# Patient Record
Sex: FEMALE | Race: WHITE | Employment: UNEMPLOYED | URBAN - METROPOLITAN AREA
[De-identification: names, ages, dates, MRNs, and addresses within clinical notes are randomized per-mention and may not be internally consistent; named-entity substitution may affect disease eponyms.]

---

## 2021-09-09 ENCOUNTER — APPOINTMENT (OUTPATIENT)
Dept: GENERAL RADIOLOGY | Age: 46
End: 2021-09-09
Payer: COMMERCIAL

## 2021-09-09 ENCOUNTER — HOSPITAL ENCOUNTER (EMERGENCY)
Age: 46
Discharge: HOME | End: 2021-09-09
Attending: EMERGENCY MEDICINE
Payer: COMMERCIAL

## 2021-09-09 VITALS
OXYGEN SATURATION: 98 % | WEIGHT: 150 LBS | HEIGHT: 69 IN | TEMPERATURE: 98.4 F | BODY MASS INDEX: 22.22 KG/M2 | SYSTOLIC BLOOD PRESSURE: 117 MMHG | RESPIRATION RATE: 16 BRPM | DIASTOLIC BLOOD PRESSURE: 68 MMHG | HEART RATE: 67 BPM

## 2021-09-09 DIAGNOSIS — S61.052A CAT BITE OF LEFT THUMB, INITIAL ENCOUNTER: Primary | ICD-10-CM
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
## 2021-09-09 PROCEDURE — 6360000002 HC RX W HCPCS: Performed by: EMERGENCY MEDICINE

## 2021-09-09 PROCEDURE — 90715 TDAP VACCINE 7 YRS/> IM: CPT | Performed by: EMERGENCY MEDICINE

## 2021-09-09 PROCEDURE — 73140 X-RAY EXAM OF FINGER(S): CPT

## 2021-09-09 PROCEDURE — 90471 IMMUNIZATION ADMIN: CPT | Performed by: EMERGENCY MEDICINE

## 2021-09-09 PROCEDURE — 99283 EMERGENCY DEPT VISIT LOW MDM: CPT

## 2021-09-09 PROCEDURE — 6370000000 HC RX 637 (ALT 250 FOR IP): Performed by: EMERGENCY MEDICINE

## 2021-09-09 RX ORDER — AMOXICILLIN AND CLAVULANATE POTASSIUM 875; 125 MG/1; MG/1
1 TABLET, FILM COATED ORAL ONCE
Status: COMPLETED | OUTPATIENT
Start: 2021-09-09 | End: 2021-09-09

## 2021-09-09 RX ORDER — LIDOCAINE HYDROCHLORIDE AND EPINEPHRINE BITARTRATE 20; .01 MG/ML; MG/ML
20 INJECTION, SOLUTION SUBCUTANEOUS ONCE
Status: DISCONTINUED | OUTPATIENT
Start: 2021-09-09 | End: 2021-09-09

## 2021-09-09 RX ORDER — AMOXICILLIN AND CLAVULANATE POTASSIUM 875; 125 MG/1; MG/1
1 TABLET, FILM COATED ORAL 2 TIMES DAILY
Qty: 20 TABLET | Refills: 0 | Status: SHIPPED | OUTPATIENT
Start: 2021-09-09 | End: 2021-09-19

## 2021-09-09 RX ORDER — ACETAMINOPHEN 500 MG
TABLET ORAL
Status: DISPENSED
Start: 2021-09-09 | End: 2021-09-10

## 2021-09-09 RX ORDER — AMOXICILLIN AND CLAVULANATE POTASSIUM 875; 125 MG/1; MG/1
TABLET, FILM COATED ORAL
Status: DISPENSED
Start: 2021-09-09 | End: 2021-09-10

## 2021-09-09 RX ORDER — NAPROXEN 500 MG/1
500 TABLET ORAL 2 TIMES DAILY
Qty: 20 TABLET | Refills: 0 | Status: SHIPPED | OUTPATIENT
Start: 2021-09-09 | End: 2021-09-19

## 2021-09-09 RX ORDER — ACETAMINOPHEN 500 MG
1000 TABLET ORAL ONCE
Status: COMPLETED | OUTPATIENT
Start: 2021-09-09 | End: 2021-09-09

## 2021-09-09 RX ADMIN — ACETAMINOPHEN 1000 MG: 500 TABLET, FILM COATED ORAL at 21:53

## 2021-09-09 RX ADMIN — AMOXICILLIN AND CLAVULANATE POTASSIUM 1 TABLET: 875; 125 TABLET, FILM COATED ORAL at 21:52

## 2021-09-09 RX ADMIN — TETANUS TOXOID, REDUCED DIPHTHERIA TOXOID AND ACELLULAR PERTUSSIS VACCINE, ADSORBED 0.5 ML: 5; 2.5; 8; 8; 2.5 SUSPENSION INTRAMUSCULAR at 21:53

## 2021-09-09 ASSESSMENT — PAIN SCALES - GENERAL
PAINLEVEL_OUTOF10: 5
PAINLEVEL_OUTOF10: 5

## 2021-09-09 ASSESSMENT — PAIN DESCRIPTION - ORIENTATION: ORIENTATION: LEFT

## 2021-09-09 ASSESSMENT — PAIN DESCRIPTION - LOCATION: LOCATION: HAND

## 2021-09-10 NOTE — ED PROVIDER NOTES
Triage Chief Complaint:   Animal Bite (cat bite to left thumb last evening)    [place]:  Iftikhar Arellano is a 55 y.o. female that presents with a cat bite to left thumb. Patient was in baseline state of health until approximately 24 hours ago when a cat bit her thumb. Patient initially washed off the wound but has had some progressive pain and redness to this area. Patient reports that she \"went on [employer]\" and saw some bad pictures of infected cat bites and this prompted ED visit. Patient reports isolated injury to her left thumb. Bite occurred at the distal pad where the nail ends at the tip of the finger. Patient has not had any drainage from this area. There is no redness streaking up the finger. No fevers. Patient is visiting from out of town where she lives in [place]. Patient's tetanus is not up-to-date. ROS:  General:  No fevers, no chills  Respiratory:  No shortness of breath  Neurologic:  No numbness, no weakness  Extremities:  No edema, + thumb pain  Skin:  No rash. + Cat bite wound. Psych: No axienty    History reviewed. No pertinent past medical history. History reviewed. No pertinent surgical history. History reviewed. No pertinent family history.   Social History     Socioeconomic History    Marital status:      Spouse name: Not on file    Number of children: Not on file    Years of education: Not on file    Highest education level: Not on file   Occupational History    Not on file   Tobacco Use    Smoking status: Former Smoker    Smokeless tobacco: Never Used   Substance and Sexual Activity    Alcohol use: Not Currently    Drug use: Never    Sexual activity: Not on file   Other Topics Concern    Not on file   Social History Narrative    Not on file     Social Determinants of Health     Financial Resource Strain:     Difficulty of Paying Living Expenses:    Food Insecurity:     Worried About Running Out of Food in the Last Year:     [address] in the Last Year: Transportation Needs:     Lack of Transportation (Medical):  Lack of Transportation (Non-Medical):    Physical Activity:     Days of Exercise per Week:     Minutes of Exercise per Session:    Stress:     Feeling of Stress :    Social Connections:     Frequency of Communication with Friends and Family:     Frequency of Social Gatherings with Friends and Family:     Attends Jainism Services:     Active Member of Clubs or Organizations:     Attends Club or Organization Meetings:     Marital Status:    Intimate Partner Violence:     Fear of Current or Ex-Partner:     Emotionally Abused:     Physically Abused:     Sexually Abused:      No current facility-administered medications for this encounter. Current Outpatient Medications   Medication Sig Dispense Refill    amoxicillin-clavulanate (AUGMENTIN) 875-125 MG per tablet Take 1 tablet by mouth 2 times daily for 10 days 20 tablet 0    naproxen (NAPROSYN) 500 MG tablet Take 1 tablet by mouth 2 times daily for 10 days 20 tablet 0     No Known Allergies    Nursing Notes Reviewed    Physical Exam:  ED Triage Vitals   Enc Vitals Group      BP 09/09/21 2135 (!) 138/98      Pulse 09/09/21 2135 76      Resp 09/09/21 2135 16      Temp 09/09/21 2137 98.4 °F (36.9 °C)      Temp Source 09/09/21 2137 Oral      SpO2 09/09/21 2135 97 %      Weight 09/09/21 2135 150 lb (68 kg)      Height 09/09/21 2135 5' 9\" (1.753 m)      Head Circumference --       Peak Flow --       Pain Score --       Pain Loc --       Pain Edu? --       Excl. in [address]? --        My pulse ox interpretation is - normal    General appearance:  No acute distress. Sitting comfortably in bed. Skin:  Warm. Dry. Punctate puncture wound that is healing to the distal pad/tip of the left thumb. There is no lymphangitic streaking. No expressible drainage. No underlying fluctuance. Eye:  Extraocular movements intact. Ears, nose, mouth and throat:  Oral mucosa moist   Extremity:  No swelling. Normal ROM. Skin findings of left thumb as above. No fusiform swelling of the left thumb digit. No tenderness along the flexor tendon sheath. Finger is not held in slight flexion. There is no pain with passive range of motion of the thumb. Heart:  Strong and symmetric peripheral pulses. Extremities are well perfused. Abdomen:  Non-distended. Respiratory:  Respirations nonlabored. Neurological:  Alert and oriented times 3. No focal neuro deficits. Sensation intact to light touch to distal upper/lower extremities; 5/5 and symmetric  and dorsi/plantar flexion. I have reviewed and interpreted all of the currently available lab results from this visit (if applicable):  No results found for this visit on 09/09/21. Radiographs (if obtained):  [] The following radiograph was interpreted by myself in the absence of a radiologist:   [x] Radiologist's Report Reviewed:  XR FINGER LEFT (MIN 2 VIEWS)   Final Result   No acute osseous abnormality or radiopaque foreign body. EKG (if obtained): (All EKG's are interpreted by myself in the absence of a cardiologist)    Chart review shows recent radiographs:  XR FINGER LEFT (MIN 2 VIEWS)    Result Date: 9/9/2021  EXAMINATION: THREE XRAY VIEWS OF THE LEFT FINGERS 9/9/2021 9:59 pm COMPARISON: None. HISTORY: ORDERING SYSTEM PROVIDED HISTORY: left thumb cat bite TECHNOLOGIST PROVIDED HISTORY: Reason for exam:->left thumb cat bite Reason for Exam: cat bite on thumb Acuity: Acute Type of Exam: Initial FINDINGS: There is no acute fracture or suspect osseous lesion. Alignment is normal. No focal soft tissue swelling or radiopaque foreign body is evident. No acute osseous abnormality or radiopaque foreign body. MDM:  Pt presents as above. Emergent conditions considered. Presentation prompted initial x-ray as above which was negative for acute osseous abnormality or radiopaque foreign body.   Patient is started on Augmentin with first dose given in the emergency department. Tetanus is updated. There is no evidence of flexor tenosynovitis or more insidious infectious process. I will cover for cat bite wound infection as above. Patient will be monitoring symptoms very closely. Return to ED precautions discussed. I discussed specific signs and symptoms and when to return to the emergency department as well as need for close outpatient follow-up. Questions sought and answered with the patient. They voice understanding and agree with plan. Clinical Impression:  1. Cat bite of left thumb, initial encounter      Disposition referral (if applicable): Your Primary Care Physician    Schedule an appointment as soon as possible for a visit   [address]  [address]  [phone]  Today  If symptoms worsen    Disposition medications (if applicable):  Discharge Medication List as of 9/9/2021 10:31 PM      START taking these medications    Details   amoxicillin-clavulanate (AUGMENTIN) 875-125 MG per tablet Take 1 tablet by mouth 2 times daily for 10 days, Disp-20 tablet, R-0Normal      naproxen (NAPROSYN) 500 MG tablet Take 1 tablet by mouth 2 times daily for 10 days, Disp-20 tablet, R-0Normal             Comment: Please note this report has been produced using speech recognition software and may contain errors related to that system including errors in grammar, punctuation, and spelling, as well as words and phrases that may be inappropriate. If there are any questions or concerns please feel free to contact the dictating provider for clarification.          Lucy Arriaza MD  09/10/21 2173

## 2021-09-10 NOTE — ED NOTES
Patient verbalizes understanding of discharge instructions. Patient walks out of ED with an upright and steady gait with even and unlabored respirations.       Brody Richardson RN  09/09/21 0393